# Patient Record
Sex: MALE | Race: WHITE
[De-identification: names, ages, dates, MRNs, and addresses within clinical notes are randomized per-mention and may not be internally consistent; named-entity substitution may affect disease eponyms.]

---

## 2018-05-05 ENCOUNTER — HOSPITAL ENCOUNTER (INPATIENT)
Dept: HOSPITAL 47 - EC | Age: 83
LOS: 4 days | Discharge: NURSING, REHAB OR LONG-TERM CARE | DRG: 481 | End: 2018-05-09
Payer: MEDICARE

## 2018-05-05 DIAGNOSIS — Z87.442: ICD-10-CM

## 2018-05-05 DIAGNOSIS — J44.1: ICD-10-CM

## 2018-05-05 DIAGNOSIS — I71.4: ICD-10-CM

## 2018-05-05 DIAGNOSIS — I48.0: ICD-10-CM

## 2018-05-05 DIAGNOSIS — R79.1: ICD-10-CM

## 2018-05-05 DIAGNOSIS — Z79.4: ICD-10-CM

## 2018-05-05 DIAGNOSIS — Z79.899: ICD-10-CM

## 2018-05-05 DIAGNOSIS — N18.3: ICD-10-CM
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
## 2018-05-05 DIAGNOSIS — N40.0: ICD-10-CM

## 2018-05-05 DIAGNOSIS — E11.649: ICD-10-CM

## 2018-05-05 DIAGNOSIS — Z87.891: ICD-10-CM

## 2018-05-05 DIAGNOSIS — Z85.46: ICD-10-CM

## 2018-05-05 DIAGNOSIS — E07.9: ICD-10-CM

## 2018-05-05 DIAGNOSIS — Z82.49: ICD-10-CM

## 2018-05-05 DIAGNOSIS — N39.0: ICD-10-CM

## 2018-05-05 DIAGNOSIS — I44.0: ICD-10-CM

## 2018-05-05 DIAGNOSIS — E11.22: ICD-10-CM

## 2018-05-05 DIAGNOSIS — E78.5: ICD-10-CM

## 2018-05-05 DIAGNOSIS — S72.142A: Primary | ICD-10-CM

## 2018-05-05 DIAGNOSIS — Z79.01: ICD-10-CM

## 2018-05-05 DIAGNOSIS — E11.51: ICD-10-CM

## 2018-05-05 DIAGNOSIS — Z92.3: ICD-10-CM

## 2018-05-05 DIAGNOSIS — I12.9: ICD-10-CM

## 2018-05-05 LAB
ANION GAP SERPL CALC-SCNC: 14 MMOL/L
APTT BLD: 35.8 SEC (ref 22–30)
BASOPHILS # BLD AUTO: 0 K/UL (ref 0–0.2)
BASOPHILS NFR BLD AUTO: 0 %
CHLORIDE SERPL-SCNC: 108 MMOL/L (ref 98–107)
CO2 SERPL-SCNC: 16 MMOL/L (ref 22–30)
EOSINOPHIL # BLD AUTO: 0 K/UL (ref 0–0.7)
EOSINOPHIL NFR BLD AUTO: 0 %
ERYTHROCYTE [DISTWIDTH] IN BLOOD BY AUTOMATED COUNT: 4.88 M/UL (ref 4.3–5.9)
ERYTHROCYTE [DISTWIDTH] IN BLOOD: 13.6 % (ref 11.5–15.5)
GLUCOSE BLD-MCNC: 83 MG/DL (ref 75–99)
HCT VFR BLD AUTO: 45.8 % (ref 39–53)
HGB BLD-MCNC: 14.5 GM/DL (ref 13–17.5)
INR PPP: 3.8 (ref ?–1.2)
LYMPHOCYTES # SPEC AUTO: 0.8 K/UL (ref 1–4.8)
LYMPHOCYTES NFR SPEC AUTO: 8 %
MCH RBC QN AUTO: 29.7 PG (ref 25–35)
MCHC RBC AUTO-ENTMCNC: 31.6 G/DL (ref 31–37)
MCV RBC AUTO: 93.8 FL (ref 80–100)
MONOCYTES # BLD AUTO: 0.7 K/UL (ref 0–1)
MONOCYTES NFR BLD AUTO: 6 %
NEUTROPHILS # BLD AUTO: 8.6 K/UL (ref 1.3–7.7)
NEUTROPHILS NFR BLD AUTO: 84 %
PLATELET # BLD AUTO: 220 K/UL (ref 150–450)
POTASSIUM SERPL-SCNC: 4.5 MMOL/L (ref 3.5–5.1)
PT BLD: 33.8 SEC (ref 9–12)
SODIUM SERPL-SCNC: 138 MMOL/L (ref 137–145)
WBC # BLD AUTO: 10.2 K/UL (ref 3.8–10.6)

## 2018-05-05 PROCEDURE — 93005 ELECTROCARDIOGRAM TRACING: CPT

## 2018-05-05 PROCEDURE — 73502 X-RAY EXAM HIP UNI 2-3 VIEWS: CPT

## 2018-05-05 PROCEDURE — 83036 HEMOGLOBIN GLYCOSYLATED A1C: CPT

## 2018-05-05 PROCEDURE — 94640 AIRWAY INHALATION TREATMENT: CPT

## 2018-05-05 PROCEDURE — 99285 EMERGENCY DEPT VISIT HI MDM: CPT

## 2018-05-05 PROCEDURE — 85027 COMPLETE CBC AUTOMATED: CPT

## 2018-05-05 PROCEDURE — 85730 THROMBOPLASTIN TIME PARTIAL: CPT

## 2018-05-05 PROCEDURE — 81001 URINALYSIS AUTO W/SCOPE: CPT

## 2018-05-05 PROCEDURE — 80048 BASIC METABOLIC PNL TOTAL CA: CPT

## 2018-05-05 PROCEDURE — 85025 COMPLETE CBC W/AUTO DIFF WBC: CPT

## 2018-05-05 PROCEDURE — 85610 PROTHROMBIN TIME: CPT

## 2018-05-05 PROCEDURE — 80051 ELECTROLYTE PANEL: CPT

## 2018-05-05 PROCEDURE — 71045 X-RAY EXAM CHEST 1 VIEW: CPT

## 2018-05-05 NOTE — ED
General Adult HPI





- General


Chief complaint: Extremity Injury, Lower


Stated complaint: FALL


Time Seen by Provider: 05/05/18 16:52


Source: patient, EMS, RN notes reviewed, old records reviewed


Mode of arrival: EMS


Limitations: no limitations





- History of Present Illness


Initial comments: 





this is an 82-year-old male the ER for evaluation.  Patient does say for 

evaluation regards to significant fall with positive left hip fracture.  

Patient is to be admitted for left hip fracture and evaluation.  Patient 

accepted in transfer for evaluation regarding left hip fracture





- Related Data


 Home Medications











 Medication  Instructions  Recorded  Confirmed


 


Insuln Asp Prt/Insulin Aspart 35 unit SQ AC-SUPPER 05/05/18 05/05/18





[NovoLOG MIX 70-30 VIAL]   


 


Insuln Asp Prt/Insulin Aspart 40 unit SQ AC-BRKFST 05/05/18 05/05/18





[NovoLOG MIX 70-30 VIAL]   


 


Lisinopril 40 mg PO HS 05/05/18 05/05/18


 


Methazolamide 50 mg PO BID 05/05/18 05/05/18


 


Propranolol [Inderal] 40 mg PO BID 05/05/18 05/05/18


 


Simvastatin [Zocor] 80 mg PO HS 05/05/18 05/05/18


 


Warfarin Sodium [Coumadin] 4 mg PO SUTUTHSA 05/05/18 05/05/18


 


Warfarin [Coumadin] 5 mg PO MOWEFR 05/05/18 05/05/18











 Allergies











Allergy/AdvReac Type Severity Reaction Status Date / Time


 


No Known Allergies Allergy   Verified 05/05/18 18:08














Review of Systems


ROS Statement: 


Those systems with pertinent positive or pertinent negative responses have been 

documented in the HPI.





ROS Other: All systems not noted in ROS Statement are negative.





Past Medical History


Past Medical History: Cancer, COPD, Diabetes Mellitus, Hyperlipidemia, 

Hypertension, Prostate Disorder


Additional Past Medical History / Comment(s): PAD, radiation for prostate 

cancer years ago, AAA that is being monitored, kidney stones


History of Any Multi-Drug Resistant Organisms: None Reported


Past Surgical History: Tonsillectomy


Additional Past Surgical History / Comment(s): patient states he had a surgery 

to tie is main vein to his heart and that is why he is on warfarin, lithotripsy


Past Psychological History: No Psychological Hx Reported


Smoking Status: Former smoker


Past Alcohol Use History: Rare


Past Drug Use History: None Reported





General Exam


Limitations: no limitations


General appearance: alert, in no apparent distress


Head exam: Present: atraumatic, normocephalic, normal inspection


Eye exam: Present: normal appearance, PERRL, EOMI.  Absent: scleral icterus, 

conjunctival injection, periorbital swelling


ENT exam: Present: normal exam, mucous membranes moist


Neck exam: Present: normal inspection.  Absent: tenderness, meningismus, 

lymphadenopathy


Respiratory exam: Present: normal lung sounds bilaterally.  Absent: respiratory 

distress, wheezes, rales, rhonchi, stridor


Cardiovascular Exam: Present: regular rate, normal rhythm, normal heart sounds.

  Absent: systolic murmur, diastolic murmur, rubs, gallop, clicks


GI/Abdominal exam: Present: soft, normal bowel sounds.  Absent: distended, 

tenderness, guarding, rebound, rigid


Extremities exam: Present: normal inspection, full ROM, normal capillary 

refill.  Absent: tenderness, pedal edema, joint swelling, calf tenderness


Back exam: Present: normal inspection


Neurological exam: Present: alert, oriented X3, CN II-XII intact


Psychiatric exam: Present: normal affect, normal mood


Skin exam: Present: warm, dry, intact, normal color.  Absent: rash





Course


 Vital Signs











  05/05/18 05/05/18





  16:52 17:41


 


Temperature 98.1 F 


 


Pulse Rate 71 73


 


Respiratory 18 18





Rate  


 


Blood Pressure 198/93 188/79


 


O2 Sat by Pulse 97 96





Oximetry  














- Reevaluation(s)


Reevaluation #1: 





05/05/18 18:19


Transient paperwork is reviewed





EKG Findings





- EKG Comments:


EKG Findings:: EKG shows sinus rhythm rate of 72, , QRS 92, QTc 431





Medical Decision Making





- Medical Decision Making





82 male the ER status post fall except in transfer for orthopedic surgery left 

hip fracture, patient will be admitted orthopedic surgery and fell evaluation, 

patient to be admitted for pain control





- Radiology Data


Radiology results: report reviewed (CT is reviewed positive for left hip 

fracture)





Disposition


Clinical Impression: 


 Fracture of left hip, Fall





Disposition: ADMITTED AS IP TO THIS hospitals


Condition: Fair


Is patient prescribed a controlled substance at d/c from ED?: No


Referrals: 


Je Moreno MD [REFERRING] - 1-2 days

## 2018-05-06 LAB
ANION GAP SERPL CALC-SCNC: 13 MMOL/L
BUN SERPL-SCNC: 28 MG/DL (ref 9–20)
CALCIUM SPEC-MCNC: 8.2 MG/DL (ref 8.4–10.2)
CHLORIDE SERPL-SCNC: 110 MMOL/L (ref 98–107)
CO2 SERPL-SCNC: 21 MMOL/L (ref 22–30)
GLUCOSE BLD-MCNC: 133 MG/DL (ref 75–99)
GLUCOSE BLD-MCNC: 217 MG/DL (ref 75–99)
GLUCOSE BLD-MCNC: 221 MG/DL (ref 75–99)
GLUCOSE BLD-MCNC: 240 MG/DL (ref 75–99)
GLUCOSE SERPL-MCNC: 174 MG/DL (ref 74–99)
GLUCOSE UR QL: (no result)
GLUCOSE UR QL: (no result)
GRAN CASTS UR QL COMP ASSIST: 4 /LPF
HBA1C MFR BLD: 7.1 % (ref 4–6)
INR PPP: 4.1 (ref ?–1.2)
KETONES UR QL STRIP.AUTO: (no result)
PH UR: 5.5 [PH] (ref 5–8)
PH UR: 6 [PH] (ref 5–8)
POTASSIUM SERPL-SCNC: 4.1 MMOL/L (ref 3.5–5.1)
PROT UR QL: (no result)
PROT UR QL: (no result)
PT BLD: 36.5 SEC (ref 9–12)
RBC UR QL: 5 /HPF (ref 0–5)
RBC UR QL: 7 /HPF (ref 0–5)
SODIUM SERPL-SCNC: 144 MMOL/L (ref 137–145)
SP GR UR: 1.02 (ref 1–1.03)
SP GR UR: 1.02 (ref 1–1.03)
SQUAMOUS UR QL AUTO: <1 /HPF (ref 0–4)
UROBILINOGEN UR QL STRIP: <2 MG/DL (ref ?–2)
UROBILINOGEN UR QL STRIP: <2 MG/DL (ref ?–2)
WBC #/AREA URNS HPF: 2 /HPF (ref 0–5)
WBC #/AREA URNS HPF: 4 /HPF (ref 0–5)

## 2018-05-06 RX ADMIN — HYDROCODONE BITARTRATE AND ACETAMINOPHEN PRN EACH: 7.5; 325 TABLET ORAL at 05:32

## 2018-05-06 RX ADMIN — METOPROLOL TARTRATE SCH MG: 25 TABLET, FILM COATED ORAL at 20:06

## 2018-05-06 RX ADMIN — INSULIN ASPART SCH UNIT: 100 INJECTION, SOLUTION INTRAVENOUS; SUBCUTANEOUS at 08:49

## 2018-05-06 RX ADMIN — HYDROCODONE BITARTRATE AND ACETAMINOPHEN PRN EACH: 7.5; 325 TABLET ORAL at 17:55

## 2018-05-06 RX ADMIN — HYDROCODONE BITARTRATE AND ACETAMINOPHEN PRN EACH: 7.5; 325 TABLET ORAL at 12:15

## 2018-05-06 RX ADMIN — INSULIN ASPART SCH UNIT: 100 INJECTION, SOLUTION INTRAVENOUS; SUBCUTANEOUS at 20:06

## 2018-05-06 RX ADMIN — INSULIN ASPART SCH: 100 INJECTION, SOLUTION INTRAVENOUS; SUBCUTANEOUS at 12:09

## 2018-05-06 RX ADMIN — INSULIN ASPART SCH UNIT: 100 INJECTION, SOLUTION INTRAVENOUS; SUBCUTANEOUS at 17:20

## 2018-05-06 NOTE — P.CONS
History of Present Illness





- Reason for Consult


Preoperative clearance





- History of Present Illness


82-year-old pretty functional gentleman had a mechanical fall and fracture of 

the left hip.  Patient is having some pain in the hip area was consulted for 

medical clearance.  Patient has history of atrial fibrillation presently rate 

controlled denied any chest pain patient EKG showed first-degree AV block 

propranolol will be discussed patient was started on both low-dose beta 

blocker.  Unsure whether when patient is to go for surgery will be either today 

or tomorrow patient doesn't have any murmurs no further testing or intervention 

is necessary patient can go in and get the surgery done if we have time I'll 

order an echocardiogram for preoperative clearance others patient is low to 

intermediate risk for surgery discussed with the patient patient is agreeable 

for surgery is tremulous.  Patient functionality is fairly good denied any 

coronary artery disease denied any congestive heart failure denied any 

shortness of breath used to be a smoker in the past.





Patient is diabetic and 70/30 insulin which was switched to Lantus at a lower 

dose and sliding scale insulin as I'm unsure whether patient is going to be 

nothing by mouth are not area did patient had elevated INR of 3.6 if patient is 

going for surgery tomorrow today we can give 5 mg of the oral vitamin K if that 

is agreeable from arthritic perspective.  Patient beta blocker will be switched 

to metoprolol will repeat EKG tomorrow morning because of first-degree AV block 

which is again secondary to beta blocker and dose of which is changed today.  

Coumadin will be held antidepressive medication except for beta blocker will be 

held because of high possibility of perioperative hypotension.





Review of Systems





REVIEW OF SYSTEMS: 


CONSTITUTIONAL: No fever, no malaise, no fatigue. 


HEENT: No recent visual problems or hearing problems. Denied any sore throat. 


CARDIOVASCULAR: No chest pain, orthopnea, PND, no palpitations, no syncope. 


PULMONARY: No shortness of breath, no cough, no hemoptysis. 


GASTROINTESTINAL: No diarrhea, no nausea, no vomiting, no abdominal pain. 

Normoactive bowel sounds. 


NEUROLOGICAL: No headaches, no weakness, no numbness. 


HEMATOLOGICAL: Denies any bleeding or petechiae. 


GENITOURINARY: Denies any burning micturition, frequency, or urgency. 


MUSCULOSKELETAL/RHEUMATOLOGICAL: As mentioned in HPI


ENDOCRINE: Denies any polyuria or polydipsia. 





The rest of the 14-point review of systems is negative.








Past Medical History


Past Medical History: Cancer, COPD, Diabetes Mellitus, Hyperlipidemia, 

Hypertension, Prostate Disorder


Additional Past Medical History / Comment(s): PAD, radiation for prostate 

cancer years ago, AAA that is being monitored, kidney stones


History of Any Multi-Drug Resistant Organisms: None Reported


Past Surgical History: Tonsillectomy


Additional Past Surgical History / Comment(s): patient states he had a surgery 

to tie is main vein to his heart and that is why he is on warfarin, lithotripsy


Past Psychological History: No Psychological Hx Reported


Smoking Status: Former smoker


Past Alcohol Use History: Rare


Past Drug Use History: None Reported





- Past Family History


  ** Father


Family Medical History: Myocardial Infarction (MI)





  ** Mother


Family Medical History: CVA/TIA





Medications and Allergies


 Home Medications











 Medication  Instructions  Recorded  Confirmed  Type


 


Insuln Asp Prt/Insulin Aspart 35 unit SQ AC-SUPPER 05/05/18 05/05/18 History





[NovoLOG MIX 70-30 VIAL]    


 


Insuln Asp Prt/Insulin Aspart 40 unit SQ AC-BRKFST 05/05/18 05/05/18 History





[NovoLOG MIX 70-30 VIAL]    


 


Lisinopril 40 mg PO HS 05/05/18 05/05/18 History


 


Methazolamide 50 mg PO BID 05/05/18 05/05/18 History


 


Propranolol [Inderal] 40 mg PO BID 05/05/18 05/05/18 History


 


Simvastatin [Zocor] 80 mg PO HS 05/05/18 05/05/18 History


 


Warfarin Sodium [Coumadin] 4 mg PO SUTUTHSA 05/05/18 05/05/18 History


 


Warfarin [Coumadin] 5 mg PO MOWEFR 05/05/18 05/05/18 History











 Allergies











Allergy/AdvReac Type Severity Reaction Status Date / Time


 


No Known Allergies Allergy   Verified 05/05/18 18:08














Physical Exam


Vitals: 


 Vital Signs











  Temp Pulse Pulse Resp BP BP Pulse Ox


 


 05/06/18 07:00  99 F   77  14   127/63  97


 


 05/06/18 01:05  98.2 F   97  16   158/70  97


 


 05/06/18 00:00    97  16   


 


 05/05/18 22:10    86  16   


 


 05/05/18 21:59  97.9 F   86  16   144/83  98


 


 05/05/18 18:41  98.4 F  76   18  185/81   97


 


 05/05/18 17:41   73   18  188/79   96


 


 05/05/18 16:52  98.1 F  71   18  198/93   97








 Intake and Output











 05/05/18 05/06/18 05/06/18





 22:59 06:59 14:59


 


Intake Total 1400 800 


 


Balance 1400 800 


 


Intake:   


 


  Intake, IV Titration 800 800 





  Amount   


 


    Sodium Chloride 0.9% 1, 800 800 





    000 ml @ 100 mls/hr IV .   





    Q10H ONE Rx#:[id number]   


 


  Oral 600  


 


Other:   


 


  Voiding Method Urinal  


 


  # Voids 2 3 


 


  Weight 102.058 kg  











PHYSICAL EXAMINATION: 





GENERAL: The patient is alert and oriented x3, not in any acute distress. Well 

developed, well nourished. 


HEENT: Pupils are round and equally reacting to light. EOMI. No scleral 

icterus. No conjunctival pallor. Normocephalic, atraumatic. No pharyngeal 

erythema. No thyromegaly. 


CARDIOVASCULAR: S1 and S2 present. No murmurs, rubs, or gallops. 


PULMONARY: Chest is clear to auscultation, no wheezing or crackles. 


ABDOMEN: Soft, nontender, nondistended, normoactive bowel sounds. No palpable 

organomegaly. 


MUSCULOSKELETAL: Deferred to orthopedic surgery


EXTREMITIES: No cyanosis, clubbing, or pedal edema. 


NEUROLOGICAL: Gross neurological examination did not reveal any focal deficits. 


SKIN: No rashes. 











Results


CBC & Chem 7: 


 05/05/18 22:13





 05/05/18 22:13


Labs: 


 Abnormal Lab Results - Last 24 Hours (Table)











  05/05/18 05/05/18 05/05/18 Range/Units





  22:13 22:13 22:13 


 


Neutrophils #  8.6 H    (1.3-7.7)  k/uL


 


Lymphocytes #  0.8 L    (1.0-4.8)  k/uL


 


PT   33.8 H   (9.0-12.0)  sec


 


INR   3.8 H   (<1.2)  


 


APTT   35.8 H   (22.0-30.0)  sec


 


Chloride    108 H  ()  mmol/L


 


Carbon Dioxide    16 L  (22-30)  mmol/L


 


POC Glucose (mg/dL)     (75-99)  mg/dL


 


Urine Protein     (Negative)  


 


Urine Glucose (UA)     (Negative)  


 


Urine Ketones     (Negative)  


 


Urine Blood     (Negative)  


 


Urine RBC     (0-5)  /hpf


 


Urine Bacteria     (None)  /hpf


 


Urine Mucus     (None)  /hpf














  05/06/18 05/06/18 Range/Units





  06:44 07:30 


 


Neutrophils #    (1.3-7.7)  k/uL


 


Lymphocytes #    (1.0-4.8)  k/uL


 


PT    (9.0-12.0)  sec


 


INR    (<1.2)  


 


APTT    (22.0-30.0)  sec


 


Chloride    ()  mmol/L


 


Carbon Dioxide    (22-30)  mmol/L


 


POC Glucose (mg/dL)  221 H   (75-99)  mg/dL


 


Urine Protein   2+ H  (Negative)  


 


Urine Glucose (UA)   4+ H  (Negative)  


 


Urine Ketones   1+ H  (Negative)  


 


Urine Blood   Small H  (Negative)  


 


Urine RBC   7 H  (0-5)  /hpf


 


Urine Bacteria   Rare H  (None)  /hpf


 


Urine Mucus   Rare H  (None)  /hpf














Assessment and Plan


Plan: 


1 preoperative clearance for left hip arthroplasty elective surgery: Patient is 

low to intermediate risk.


-Atrial fibrillation presently rate controlled management of beta blockers as 

mentioned above hold off Coumadin for possible surgery today or tomorrow


-Hypertension


-Type 2 diabetes mellitus insulin regimen as mentioned above Lantus 35 units 

along with sliding scale insulin.  Check the blood sugars.


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


-Hyperlipidemia


-COPD without any acute exacerbation


-Benign prostatic hypertrophy

## 2018-05-06 NOTE — XR
EXAMINATION TYPE: XR chest 1V portable

 

DATE OF EXAM: 5/6/2018

 

HISTORY: Surgical clearance.

 

REFERENCE: NONE.

 

FINDINGS: The lungs are clear. Pleural space are clear. The heart is not enlarged.

 

IMPRESSION: 

 

NO ACTIVE INTRATHORACIC DISEASE.

## 2018-05-06 NOTE — P.HPOR
History of Present Illness


H&P Date: 05/06/18


Chief Complaint: Left hip pain status post fall





Patient is a very pleasant 82-year-old male who is seen and examined at bedside 

for further evaluation for his known left intertrochanteric hip fracture.  

Patient states he was in his kitchen yesterday when he turned one direction, 

then turned the other direction, and then fell on his left hip.  He was taken 

to [facility] for further evaluation.  He was subsequently transferred 

to [facility] and admitted to Dr. Edwards's service.  Imaging 

taken at [facility] did show evidence of a left intertrochanteric hip fracture.  

Patient has continued to have significant pain at the left hip following his 

fall.  He has been nonambulatory.  He denies any right hip pain or discomfort.  

He denies any other injuries.  Patient states he is known to be on Coumadin 

which he states is for his legs.  Coumadin is currently being held in 

anticipation for surgical intervention.  Lab testing yesterday showed an INR 

level of 3.8.  Patient has been seen and examined by Dr. Will in medicine 

today who is seeing the patient for surgical clearance.  He has ordered a 

repeat PT/INR lab draws.  Urinalysis was also taken last night which nursing 

states it is unclear whether it was a clean catch.  His urinalysis did show 

evidence of urine RBC, WBC, bacteria, mucous, protein, glucose, and ketones.  

Patient has no other complaints this morning.  He is eating and voiding without 

difficulty.





Past Medical History


Past Medical History: Cancer, COPD, Diabetes Mellitus, Hyperlipidemia, 

Hypertension, Prostate Disorder


Additional Past Medical History / Comment(s): PAD, radiation for prostate 

cancer years ago, AAA that is being monitored, kidney stones


History of Any Multi-Drug Resistant Organisms: None Reported


Past Surgical History: Tonsillectomy


Additional Past Surgical History / Comment(s): patient states he had a surgery 

to tie is main vein to his heart and that is why he is on warfarin, lithotripsy


Past Psychological History: No Psychological Hx Reported


Smoking Status: Former smoker


Past Alcohol Use History: Rare


Past Drug Use History: None Reported





- Past Family History


  ** Father


Family Medical History: Myocardial Infarction (MI)





  ** Mother


Family Medical History: CVA/TIA





Medications and Allergies


 Home Medications











 Medication  Instructions  Recorded  Confirmed  Type


 


Insuln Asp Prt/Insulin Aspart 35 unit SQ AC-SUPPER 05/05/18 05/05/18 History





[NovoLOG MIX 70-30 VIAL]    


 


Insuln Asp Prt/Insulin Aspart 40 unit SQ AC-BRKFST 05/05/18 05/05/18 History





[NovoLOG MIX 70-30 VIAL]    


 


Lisinopril 40 mg PO HS 05/05/18 05/05/18 History


 


Methazolamide 50 mg PO BID 05/05/18 05/05/18 History


 


Propranolol [Inderal] 40 mg PO BID 05/05/18 05/05/18 History


 


Simvastatin [Zocor] 80 mg PO HS 05/05/18 05/05/18 History


 


Warfarin Sodium [Coumadin] 4 mg PO SUTUTHSA 05/05/18 05/05/18 History


 


Warfarin [Coumadin] 5 mg PO MOWEFR 05/05/18 05/05/18 History











 Allergies











Allergy/AdvReac Type Severity Reaction Status Date / Time


 


No Known Allergies Allergy   Verified 05/05/18 18:08














Physical Examination





Physical exam: 


Patient is awake, alert, and oriented 3


Vital signs stable


Good chest excursion with deep inspiration and expiration


Abdomen soft nontender


Left lower extremity is shortened and externally rotated


Pain with palpation of the left hip


Significant pain with internal and external rotation of the left hip


No pain with internal and external rotation of the right hip


Neurovascularly intact bilateral lower extremities


Dorsiflexion, plantarflexion, and extensor hallucis longus positive sustained 

bilaterally


Calves are soft and supple; No signs or symptoms of DVT; No calf pain





Results





Pertinent studies:


PT/INR taken on 05/05/2018: INR 3.8; PT 33.8





Urinalysis taken on 05/06/2018: Positive for urine RBC, WBC, bacteria, mucous, 

protein, glucose, and ketones





- Labs


Labs: 


 Abnormal Lab Results - Last 24 Hours (Table)











  05/05/18 05/05/18 05/05/18 Range/Units





  22:13 22:13 22:13 


 


Neutrophils #  8.6 H    (1.3-7.7)  k/uL


 


Lymphocytes #  0.8 L    (1.0-4.8)  k/uL


 


PT   33.8 H   (9.0-12.0)  sec


 


INR   3.8 H   (<1.2)  


 


APTT   35.8 H   (22.0-30.0)  sec


 


Chloride    108 H  ()  mmol/L


 


Carbon Dioxide    16 L  (22-30)  mmol/L


 


POC Glucose (mg/dL)     (75-99)  mg/dL


 


Urine Protein     (Negative)  


 


Urine Glucose (UA)     (Negative)  


 


Urine Ketones     (Negative)  


 


Urine Blood     (Negative)  


 


Urine RBC     (0-5)  /hpf


 


Urine Bacteria     (None)  /hpf


 


Urine Mucus     (None)  /hpf














  05/06/18 05/06/18 Range/Units





  06:44 07:30 


 


Neutrophils #    (1.3-7.7)  k/uL


 


Lymphocytes #    (1.0-4.8)  k/uL


 


PT    (9.0-12.0)  sec


 


INR    (<1.2)  


 


APTT    (22.0-30.0)  sec


 


Chloride    ()  mmol/L


 


Carbon Dioxide    (22-30)  mmol/L


 


POC Glucose (mg/dL)  221 H   (75-99)  mg/dL


 


Urine Protein   2+ H  (Negative)  


 


Urine Glucose (UA)   4+ H  (Negative)  


 


Urine Ketones   1+ H  (Negative)  


 


Urine Blood   Small H  (Negative)  


 


Urine RBC   7 H  (0-5)  /hpf


 


Urine Bacteria   Rare H  (None)  /hpf


 


Urine Mucus   Rare H  (None)  /hpf








 H & H











  05/05/18 Range/Units





  22:13 


 


Hgb  14.5  (13.0-17.5)  gm/dL


 


Hct  45.8  (39.0-53.0)  %








 Coagulation











  05/05/18 Range/Units





  22:13 


 


INR  3.8 H  (<1.2)  











Result Diagrams: 


 05/05/18 22:13





 05/05/18 22:13





Assessment and Plan


Assessment: 





Assessment:


Left intertrochanteric hip fracture status post fall


Left hip pain


Currently on anticoagulation therapy with warfarin


Possible urinary tract infection


(1) Intertrochanteric fracture of left femur


Current Visit: Yes   Status: Acute   Code(s): S72.142A - DISPLACED 

INTERTROCHANTERIC FRACTURE OF LEFT FEMUR, INIT   SNOMED Code(s): 910817654


   





(2) Left hip pain


Current Visit: Yes   Status: Acute   Code(s): M25.552 - PAIN IN LEFT HIP   

SNOMED Code(s): 14650980


   





(3) Status post fall


Current Visit: Yes   Status: Acute   Code(s): Z91.81 - HISTORY OF FALLING   

SNOMED Code(s): 918130345


   





(4) Warfarin anticoagulation


Current Visit: Yes   Status: Acute   Code(s): Z79.01 - LONG TERM (CURRENT) USE 

OF ANTICOAGULANTS   SNOMED Code(s): 13755406


   





(5) Fall


Current Visit: Yes   Status: Acute   Code(s): W19.XXXA - UNSPECIFIED FALL, 

INITIAL ENCOUNTER   SNOMED Code(s): 7481688


   


Plan: 





Plan:


1.  Patient was admitted to Dr. Edwards at [facility] after 

transfer from [facility].  Patient has evidence of a left 

intertrochanteric hip fracture.  Patient will plan to undergo a ORIF of the 

left hip with intramedullary nail fixation for left intertrochanteric hip 

fracture.  Patient currently has an elevated INR level of 3.8.  Repeat lab 

draws have been ordered.  If his INR level is able to reduce to a therapeutic 

range, we will plan to proceed for surgical intervention tomorrow, 05/07/2018 

at approximately 1500 hrs.  Patient will remain on bedrest and nonweightbearing 

on the left lower extremity.  He may eat a regular diet today while becoming 

nothing by mouth status at midnight.  Patient will continue to be seen and 

examined by Dr. Will to obtain surgical clearance prior to surgical 

intervention.  Chest x-ray has been ordered today.  We will also order a repeat 

urinalysis after there is some question as to if his previous urinalysis was 

clean catch and lab testing resulted multiple findings including urine RBC, WBC

, bacteria, mucous, protein, glucose, and ketones.  If urinalysis results 

abnormal findings, nursing will plan to contact medicine for an appropriate 

antibiotic regimen.  We'll continue to follow patient closely and we'll plan 

for surgical intervention tomorrow if cleared from a medical standpoint.


2.  Dr. Will in medicine will continue following the patient for medical 

clearance prior to surgical intervention


3.  Patient has been discussed in detail with Dr. Sherif Edwards and he agrees 

with this plan


Time with Patient: Less than 30

## 2018-05-06 NOTE — PN
PROGRESS NOTE



DATE OF SERVICE:

05/06/2018.



HISTORY:

Mr. Vieyra is a very pleasant 82-year-old gentleman.  I visited him at the bedside

today.  He does live alone.  Yesterday he was in his kitchen and he twisted and turned

and fell onto his left side.  He was unable to move it.  He was initially taken to

[facility].  Workup including CT scan the left hip revealed a minimally

displaced intertrochanteric hip fracture.  He was then subsequently transferred to

[facility] for care of this.  He was admitted and admitted from the emergency

department to my service.

He is resting comfortably when I saw him this morning.  He has no pain to report other

than to the left hip.



PHYSICAL EXAM:

On examination, he is afebrile.  Vital signs are stable.  He has no pain with palpation

or range of motion of all long bones and joints with the exception of left lower

extremity.  The skin over the left hip is intact.  He has intact flexion-extension,

inversion-eversion of his foot.  He has intact flexion-extension of his toes.  He has

intact lateral medial plantar and first dorsal webspace sensation in the left foot and

he has a palpable posterior tibial pulse and brisk capillary refill in all digits.



RADIOGRAPHIC STUDIES:

CT scan from [facility] in [place] shows a minimally displaced 2-part intertrochanteric

hip fracture on the left side.



IMPRESSION:

Left minimally displaced intertrochanteric hip fracture of the left hip.



RECOMMENDATIONS:

I had a long discussion with Mr. Vieyra with regard to treatment options.  He is an

independent ambulator.  Recommendation was for intramedullary hip screw fixation for

his left intertrochanteric hip fracture.  His INR last drawn was 3.4.  He is also an

insulin-dependent diabetic.  We will have Internal Medicine see him and clear him for

surgery.  Also need to stop his Coumadin and allow his INR to fall down to a more

acceptable level for surgery.  We will plan to go forward with operative intervention

of his left hip tomorrow.  We will check his coags tomorrow morning.  All of Mr. Vieyra's questions were answered to his satisfaction.





MMODL / IJN: [id number] / Job#: [id number]

## 2018-05-07 LAB
ERYTHROCYTE [DISTWIDTH] IN BLOOD BY AUTOMATED COUNT: 4.14 M/UL (ref 4.3–5.9)
ERYTHROCYTE [DISTWIDTH] IN BLOOD: 13.3 % (ref 11.5–15.5)
GLUCOSE BLD-MCNC: 105 MG/DL (ref 75–99)
GLUCOSE BLD-MCNC: 111 MG/DL (ref 75–99)
GLUCOSE BLD-MCNC: 143 MG/DL (ref 75–99)
GLUCOSE BLD-MCNC: 67 MG/DL (ref 75–99)
GLUCOSE BLD-MCNC: 80 MG/DL (ref 75–99)
GLUCOSE BLD-MCNC: 97 MG/DL (ref 75–99)
HCT VFR BLD AUTO: 38.5 % (ref 39–53)
HGB BLD-MCNC: 12.5 GM/DL (ref 13–17.5)
INR PPP: 1.9 (ref ?–1.2)
INR PPP: 2.8 (ref ?–1.2)
MCH RBC QN AUTO: 30.3 PG (ref 25–35)
MCHC RBC AUTO-ENTMCNC: 32.6 G/DL (ref 31–37)
MCV RBC AUTO: 92.9 FL (ref 80–100)
PLATELET # BLD AUTO: 171 K/UL (ref 150–450)
PT BLD: 17.2 SEC (ref 9–12)
PT BLD: 24.9 SEC (ref 9–12)
WBC # BLD AUTO: 7.9 K/UL (ref 3.8–10.6)

## 2018-05-07 PROCEDURE — 0QS706Z REPOSITION LEFT UPPER FEMUR WITH INTRAMEDULLARY INTERNAL FIXATION DEVICE, OPEN APPROACH: ICD-10-PCS

## 2018-05-07 RX ADMIN — INSULIN ASPART SCH: 100 INJECTION, SOLUTION INTRAVENOUS; SUBCUTANEOUS at 23:59

## 2018-05-07 RX ADMIN — IPRATROPIUM BROMIDE AND ALBUTEROL SULFATE PRN ML: .5; 3 SOLUTION RESPIRATORY (INHALATION) at 17:09

## 2018-05-07 RX ADMIN — MORPHINE SULFATE PRN MG: 4 INJECTION, SOLUTION INTRAMUSCULAR; INTRAVENOUS at 16:52

## 2018-05-07 RX ADMIN — HYDRALAZINE HYDROCHLORIDE ONE MG: 20 INJECTION INTRAMUSCULAR; INTRAVENOUS at 21:40

## 2018-05-07 RX ADMIN — BUDESONIDE AND FORMOTEROL FUMARATE DIHYDRATE SCH: 160; 4.5 AEROSOL RESPIRATORY (INHALATION) at 21:35

## 2018-05-07 RX ADMIN — INSULIN ASPART SCH: 100 INJECTION, SOLUTION INTRAVENOUS; SUBCUTANEOUS at 17:39

## 2018-05-07 RX ADMIN — METOPROLOL TARTRATE SCH MG: 25 TABLET, FILM COATED ORAL at 09:40

## 2018-05-07 RX ADMIN — HYDROCODONE BITARTRATE AND ACETAMINOPHEN PRN EACH: 7.5; 325 TABLET ORAL at 03:32

## 2018-05-07 RX ADMIN — INSULIN ASPART SCH: 100 INJECTION, SOLUTION INTRAVENOUS; SUBCUTANEOUS at 12:02

## 2018-05-07 RX ADMIN — INSULIN ASPART SCH: 100 INJECTION, SOLUTION INTRAVENOUS; SUBCUTANEOUS at 07:40

## 2018-05-07 RX ADMIN — HYDRALAZINE HYDROCHLORIDE ONE MG: 20 INJECTION INTRAMUSCULAR; INTRAVENOUS at 21:54

## 2018-05-07 NOTE — P.PN
Subjective


82-year-old gentleman admitted secondary to fracture of the left hip.  Patient 

will undergo surgery today his INR has come down to 1.9.  My concern is his 

respiratory status patient has significant wheezing when I examined of which 

apparently improved with breathing treatments and Symbicort and the patient can 

go for surgery today.  Patient was hypoglycemic because of which Lantus dose 

was decreased.  Patient renal function is borderline and around 1.3 because of 

which Toradol was discontinued patient probably will need IV fluids and I'm 

believe his kidney function will improve with that





Constitutional: Denied any fatigue denied any fever.


Cardio vascular: denied any chest pain, palpitations


Gastrointestinal denied any nausea vomiting


Pulmonary: Denied any shortness of breath cough


Neurologic denied any new focal deficits








Objective





- Vital Signs


Vital signs: 


 Vital Signs











Temp  98.9 F   05/07/18 15:17


 


Pulse  75   05/07/18 17:20


 


Resp  16   05/07/18 14:35


 


BP  179/82   05/07/18 14:35


 


Pulse Ox  95   05/07/18 14:35








 Intake & Output











 05/06/18 05/07/18 05/07/18





 18:59 06:59 18:59


 


Intake Total 800  


 


Output Total  100 570


 


Balance 800 -100 -570


 


Intake:   


 


  Intake, IV Titration 800  





  Amount   


 


    Sodium Chloride 0.9% 1, 800  





    000 ml @ 100 mls/hr IV .   





    Q10H ONE Rx#:[id number]   


 


Output:   


 


  Urine  100 570


 


Other:   


 


  Voiding Method  Urinal Urinal


 


  # Voids  1 2














- Exam


PHYSICAL EXAMINATION: 





GENERAL: The patient is alert and oriented x3, not in any acute distress. Well 

developed, well nourished. 


HEENT: Pupils are round and equally reacting to light. EOMI. No scleral 

icterus. No conjunctival pallor. Normocephalic, atraumatic. No pharyngeal 

erythema. No thyromegaly. 


CARDIOVASCULAR: S1 and S2 present. No murmurs, rubs, or gallops. 


PULMONARY: Significant expiratory wheezing which improved with breathing 

treatments. 


ABDOMEN: Soft, nontender, nondistended, normoactive bowel sounds. No palpable 

organomegaly. 


MUSCULOSKELETAL: Deferred to orthopedic surgery


EXTREMITIES: No cyanosis, clubbing, or pedal edema. 


NEUROLOGICAL: Gross neurological examination did not reveal any focal deficits. 


SKIN: No rashes. 














- Labs


CBC & Chem 7: 


 05/07/18 06:51





 05/06/18 10:36


Labs: 


 Abnormal Lab Results - Last 24 Hours (Table)











  05/05/18 05/06/18 05/07/18 Range/Units





  22:13 20:01 06:51 


 


RBC    4.14 L  (4.30-5.90)  m/uL


 


Hgb    12.5 L  (13.0-17.5)  gm/dL


 


Hct    38.5 L  (39.0-53.0)  %


 


PT     (9.0-12.0)  sec


 


INR     (<1.2)  


 


POC Glucose (mg/dL)   217 H   (75-99)  mg/dL


 


Hemoglobin A1c  7.1 H    (4.0-6.0)  %














  05/07/18 05/07/18 05/07/18 Range/Units





  09:01 11:19 11:58 


 


RBC     (4.30-5.90)  m/uL


 


Hgb     (13.0-17.5)  gm/dL


 


Hct     (39.0-53.0)  %


 


PT  24.9 H    (9.0-12.0)  sec


 


INR  2.8 H    (<1.2)  


 


POC Glucose (mg/dL)   67 L  111 H  (75-99)  mg/dL


 


Hemoglobin A1c     (4.0-6.0)  %














  05/07/18 05/07/18 Range/Units





  16:37 16:47 


 


RBC    (4.30-5.90)  m/uL


 


Hgb    (13.0-17.5)  gm/dL


 


Hct    (39.0-53.0)  %


 


PT  17.2 H   (9.0-12.0)  sec


 


INR  1.9 H   (<1.2)  


 


POC Glucose (mg/dL)   105 H  (75-99)  mg/dL


 


Hemoglobin A1c    (4.0-6.0)  %














Assessment and Plan


Plan: 


1 preoperative clearance for left hip arthroplasty elective surgery: Patient is 

low to intermediate risk.


-Atrial fibrillation presently rate controlled management of beta blockers as 

mentioned above hold off Coumadin for possible surgery today , patient received 

vitamin K


-Hypertension


-Type 2 diabetes mellitus insulin regimen as mentioned above Lantus 20 units 

along with sliding scale insulin.  Lantus dose was increased from 35-20 units 

as his blood sugars went down patient will need increase the dose of Lantus as 

well as female insulin once he go back on diet


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 

was started on metoprolol


-Hyperlipidemia


-COPD with some acute exacerbation: Patient was started on inhalational 

treatments inhaled steroids


-Benign prostatic hypertrophy

## 2018-05-07 NOTE — OP
OPERATIVE REPORT



DATE OF PROCEDURE:

05/07/2018.



PREOP DIAGNOSIS:

Left intertrochanteric hip fracture.



POSTOPERATIVE DIAGNOSIS:

Left intertrochanteric hip fracture.



PROCEDURE PERFORMED:

Left intramedullary hip screw fixation for left intertrochanteric hip fracture.



SURGEON:

Sherif Edwards M.D.



ANESTHESIA:

General endotracheal.



ESTIMATED BLOOD LOSS:

25 mL.



DRAINS:

None.



COMPLICATIONS:

None apparent.



DISPOSITION:

Postanesthesia care unit.



INDICATIONS:

Mr. Vieyra is a very pleasant 82-year-old male who fell at home onto his left hip.  He

was initially taken to [facility] in [place] where his x-rays and a CT scan

found a intertrochanteric hip fracture.  He was then transferred to [facility]

for definitive care.  He is admitted to my service.  He was on Coumadin for

anticoagulation.  He was also cleared by the medical service waiting for his INR to get

down to more acceptable level for surgery.



The risks of procedure were discussed with him and his family in detail.  These risks

include, but are not limited to risk of infection, nerve damage, bleeding, pain and

risk of deep vein thrombosis which could lead to fatal pulmonary embolism.  There was

also risk of failure of the fracture to heal, failure of the implant or fracture around

the implant.  All of his and his family's questions with regards to the risks were

answered to her satisfaction.  Appropriate informed consent was obtained.



PROCEDURE:

The patient identified in the preoperative holding area.  Surgical sites marked by both

the patient myself.  He was given 2 g of Ancef IV for prophylactic purposes.  He was

then transferred to the operative suite, was placed supine on the operative table.

General anesthetic was administered dosed per the anesthesia without apparent

complication.  He was then placed onto the fracture table well-padded in preparation

for surgery.  The fracture was reduced with longitudinal traction and rotation and was

the reduction was confirmed with fluoroscopic imaging.



The patient's left lower extremity was then prepped and draped in usual sterile

fashion.  Standard surgical pause undertaken to ensure that we were operating the

correct site and that appropriate preop antibiotics were given.  All staff in the room

in agreement we proceeded.



The tip of the greater trochanter was identified.  A proximal 3 cm incision extending

from the tip of the greater trochanter proximally in line with the femur was made with

the 10 blade scalpel.  Dissection was then carried down sharply through the tensor

fascia as well.



The curved awl was then placed on the medial aspect of the tip of the greater

trochanter.  The threaded guide pin was then advanced down the shaft of the femur.

This was confirmed with fluoroscopic imaging.



The starting drill was then utilized to gain access to the proximal femur.  The

threaded guide pin was removed and then the ball-tipped guide wire was then placed down

the shaft of the femur.  Its placement was also confirmed with fluoroscopic imaging.



I then reamed the femoral shaft starting with a 9 mm reamer and incrementally

increasing up to a 13 mm reamer.  I had the representative open an 11 mm x 180 mm x 125

degree gamma nail.  This was assembled on the back table.  This was then implanted over

the ball-tipped guidewire.  The ball-tipped guidewire was then removed.



I then made a 2nd small incision on the lateral thigh.  The hip screw guide was then

placed flush against the lateral cortex of the femur.  The threaded guide pin was then

placed into the center of the femoral head on both AP and lateral views.  This was done

under fluoroscopic imaging.  The tip-apex distance was appropriate.



I then measured for length.  100 mm was measured.  The drill was set to 100 mm and then

the cannulated drill was then utilized under fluoroscopic imaging.



I then had the representative open a 10 mm x 100 mm Joanna cannulated hip screw.  This

was then inserted over the threaded guide pin deep into the center of femoral head.

The tip-apex distance was appropriate.  Excellent bone quality and the screw had

excellent purchase in bone.



I then proceeded to compress the fracture site with compression device and then placed

the set screw tightened and backed off 1/4 turn to allow for compression.



I then proceeded with placement of the distal interlocking screw.  A third small stab

incision was made over the lateral thigh.  A 5 mm x 40 mm distal screw was then placed

through the nail.  Again this was done under fluoroscopic imaging and confirmed

appropriate length and placement through the nail was confirmed with fluoroscopic

imaging.



At this point time no further work was deemed necessary.  Final AP and lateral views

were taken.  The jig was removed.  The hip screw had been placed in the center of the

femoral head on both AP and lateral views.  The tip-apex distance was appropriate and

the distal interlocking screw was through the nail and was of appropriate length.

Rotation of the leg was also checked and was found to be appropriate.



At this point time no further work was deemed necessary.  We proceeded with closure.

All wounds were irrigated with sterile saline solution with antibiotic added.  The

tensor fascia was closed with #1 Vicryl interrupted suture.  The subcutaneous tissue

was closed with 2-0 Vicryl suture.  The skin was closed with stainless steel staples.

A sterile compressive dressing was applied.



All sponge and needle counts were deemed correct prior to closure.  The patient

tolerated the procedure without apparent complication.  He was transferred recovery

room in stable condition.





MMODL / IJN: [id number] / Job#: [id number]

## 2018-05-07 NOTE — P.PN
Progress Note - Text


Progress Note Date: 05/07/18





Patient is a very pleasant 82-year-old male who is seen and examined at bedside 

for follow-up evuation for his known left intertrochanteric hip fracture. His 

symptoms have not changed since being seen examined yesterday.  Patient has 

continued to have significant pain at the left hip following his fall.  He has 

been nonambulatory.  He denies any right hip pain or discomfort.  He denies any 

other injuries.  Patient states he is known to be on Coumadin which he states 

is for his legs.  Coumadin is currently being held in anticipation for surgical 

intervention.  Lab testing yesterday showed an INR level of 4.1.  Patient has 

been seen and examined by Dr. Will in medicine today who is seeing the 

patient for surgical clearance.  He has ordered a repeat PT/INR lab draws.  

Patient has no other complaints this morning.  He is nothing by mouth status in 

anticipation for surgical intervention.





Patient History:


Patient states he was in his kitchen Saturday when he turned one direction, 

then turned the other direction, and then fell on his left hip.  He was taken 

to [facility] for further evaluation.  He was subsequently transferred 

to [facility] and admitted to Dr. Edwards's service.  Imaging 

taken at [facility] show evidence of a left intertrochanteric hip fracture.  











Physical exam: 


Patient is awake, alert, and oriented 3


Vital signs stable


Good chest excursion with deep inspiration and expiration


Abdomen soft nontender


Left lower extremity is shortened and externally rotated


Pain with palpation of the left hip


Significant pain with internal and external rotation of the left hip


No pain with internal and external rotation of the right hip


Neurovascularly intact bilateral lower extremities


Dorsiflexion, plantarflexion, and extensor hallucis longus positive sustained 

bilaterally


Calves are soft and supple; No signs or symptoms of DVT; No calf pain











Pertinent studies:


PT/INR taken on 05/05/2018: INR 3.8; PT 33.8





Analysis taken on 05/06/2018: Positive for urine RBC, WBC, bacteria, mucous, 

protein, glucose, and ketones





Urine analysis taken on 05/06/2018: Positive for urine protein, glucose with 

rare mucous small blood and trace ketones





PT/INR taken on 05/06/2018: INR 4.1; PT 36.5











Assessment:


Left intertrochanteric hip fracture status post fall


Left hip pain


Currently on anticoagulation therapy with warfarin


Possible urinary tract infection











Plan:


1.  Patient was admitted to Dr. Edwards at [facility] after 

transfer from [facility].  Patient has evidence of a left 

intertrochanteric hip fracture.  Patient will plan to undergo a ORIF of the 

left hip with intramedullary nail fixation for left intertrochanteric hip 

fracture.  Patient currently has an elevated INR level of 4.1 as of 05/06/2018.

  Repeat lab draws have been ordered stat this morning.  If his INR level is 

able to reduce to a therapeutic range, we will plan to proceed for surgical 

intervention tomorrow, 05/07/2018 at approximately 1500 hrs.  Patient will 

remain on bedrest and nonweightbearing on the left lower extremity.  He may eat 

a regular diet today while becoming nothing by mouth status at midnight.  

Patient will continue to be seen and examined by Dr. Will to obtain surgical 

clearance prior to surgical intervention.  Chest x-ray has been ordered today.  

Urinalysis obtained yesterday has improved with better results as compared to 

previous urinalysis taken earlier in the day.  We'll continue to follow patient 

closely and we'll plan for surgical intervention today at 1500 hrs. if cleared 

from a medical standpoint.


2.  Dr. Will in medicine will continue following the patient for medical 

clearance prior to surgical intervention


3.  Patient has been discussed in detail with Dr. Sherif Edwards and he agrees 

with this plan

## 2018-05-08 LAB
GLUCOSE BLD-MCNC: 144 MG/DL (ref 75–99)
GLUCOSE BLD-MCNC: 221 MG/DL (ref 75–99)
GLUCOSE BLD-MCNC: 230 MG/DL (ref 75–99)
GLUCOSE BLD-MCNC: 274 MG/DL (ref 75–99)
GLUCOSE BLD-MCNC: 301 MG/DL (ref 75–99)

## 2018-05-08 RX ADMIN — DOCUSATE SODIUM SCH MG: 100 CAPSULE, LIQUID FILLED ORAL at 21:28

## 2018-05-08 RX ADMIN — BUDESONIDE AND FORMOTEROL FUMARATE DIHYDRATE SCH PUFF: 160; 4.5 AEROSOL RESPIRATORY (INHALATION) at 07:21

## 2018-05-08 RX ADMIN — INSULIN DETEMIR SCH UNIT: 100 INJECTION, SOLUTION SUBCUTANEOUS at 00:00

## 2018-05-08 RX ADMIN — METOPROLOL TARTRATE SCH MG: 25 TABLET, FILM COATED ORAL at 00:00

## 2018-05-08 RX ADMIN — INSULIN ASPART SCH UNIT: 100 INJECTION, SOLUTION INTRAVENOUS; SUBCUTANEOUS at 08:30

## 2018-05-08 RX ADMIN — METOPROLOL TARTRATE SCH MG: 25 TABLET, FILM COATED ORAL at 21:28

## 2018-05-08 RX ADMIN — METOPROLOL TARTRATE SCH MG: 25 TABLET, FILM COATED ORAL at 08:30

## 2018-05-08 RX ADMIN — INSULIN ASPART SCH UNIT: 100 INJECTION, SOLUTION INTRAVENOUS; SUBCUTANEOUS at 21:32

## 2018-05-08 RX ADMIN — HYDROCODONE BITARTRATE AND ACETAMINOPHEN PRN EACH: 7.5; 325 TABLET ORAL at 15:31

## 2018-05-08 RX ADMIN — DOCUSATE SODIUM SCH MG: 100 CAPSULE, LIQUID FILLED ORAL at 13:58

## 2018-05-08 RX ADMIN — MORPHINE SULFATE PRN MG: 4 INJECTION, SOLUTION INTRAMUSCULAR; INTRAVENOUS at 04:22

## 2018-05-08 RX ADMIN — BUDESONIDE AND FORMOTEROL FUMARATE DIHYDRATE SCH PUFF: 160; 4.5 AEROSOL RESPIRATORY (INHALATION) at 19:47

## 2018-05-08 RX ADMIN — INSULIN DETEMIR SCH UNIT: 100 INJECTION, SOLUTION SUBCUTANEOUS at 21:32

## 2018-05-08 RX ADMIN — INSULIN ASPART SCH UNIT: 100 INJECTION, SOLUTION INTRAVENOUS; SUBCUTANEOUS at 18:08

## 2018-05-08 RX ADMIN — HYDROCODONE BITARTRATE AND ACETAMINOPHEN PRN EACH: 7.5; 325 TABLET ORAL at 08:30

## 2018-05-08 RX ADMIN — HYDROCODONE BITARTRATE AND ACETAMINOPHEN PRN EACH: 7.5; 325 TABLET ORAL at 21:35

## 2018-05-08 RX ADMIN — INSULIN ASPART SCH UNIT: 100 INJECTION, SOLUTION INTRAVENOUS; SUBCUTANEOUS at 13:58

## 2018-05-08 RX ADMIN — IPRATROPIUM BROMIDE AND ALBUTEROL SULFATE PRN ML: .5; 3 SOLUTION RESPIRATORY (INHALATION) at 07:21

## 2018-05-08 NOTE — P.PN
Subjective


Progress Note Date: 05/08/18


Principal diagnosis: 


Left IT fracture





Patient is seen at bedside this morning.  He is postop day #1 from a left IT 

fracture repair with IT nail.  He has pain at the surgical site as expected but 

denies any new complaints.  She denies numbness, tingling or calf pain.  Review 

of systems is negative for fever, chills, chest pain, shortness of breath or 

other








Objective





- Vital Signs


Vital signs: 


 Vital Signs











Temp  99.2 F   05/08/18 07:00


 


Pulse  92   05/08/18 07:38


 


Resp  17   05/07/18 22:20


 


BP  150/78   05/08/18 07:00


 


Pulse Ox  98   05/08/18 07:00








 Intake & Output











 05/07/18 05/08/18 05/08/18





 18:59 06:59 18:59


 


Intake Total  1001 120


 


Output Total 570 1100 


 


Balance -570 -99 120


 


Intake:   


 


  IV  1001 


 


  Oral   120


 


Output:   


 


  Urine 570 1075 


 


  Estimated Blood Loss  25 


 


Other:   


 


  Voiding Method Urinal Urinal 


 


  # Voids 2 1 2














- Exam


Inspection reveals a benign surgical wound.  There is no active bleeding or 

drainage.  Neurovascular status is intact throughout the lower extremity with 

motor and sensation fully intact.  Calf is soft and nontender.  2+ dorsalis 

pedis pulse and less than 2 second cap refill is present








- Constitutional


General appearance: Present: no acute distress





- Psychiatric


Psychiatric: Present: A&O x's 3, appropriate affect, intact judgment & insight





- Labs


CBC & Chem 7: 


 05/07/18 06:51





 05/06/18 10:36


Labs: 


 Abnormal Lab Results - Last 24 Hours (Table)











  05/07/18 05/07/18 05/07/18 Range/Units





  11:19 11:58 16:37 


 


PT    17.2 H  (9.0-12.0)  sec


 


INR    1.9 H  (<1.2)  


 


POC Glucose (mg/dL)  67 L  111 H   (75-99)  mg/dL














  05/07/18 05/07/18 05/08/18 Range/Units





  16:47 22:48 07:04 


 


PT     (9.0-12.0)  sec


 


INR     (<1.2)  


 


POC Glucose (mg/dL)  105 H  143 H  144 H  (75-99)  mg/dL














Assessment and Plan


(1) Fracture of left hip


Narrative/Plan: 


He will continue with routine postop orthopedic protocol including pain 

management, wound care, physical therapy, DVT prophylaxis and medical 

management.  Expect that he will transfer to rehab in the next 1-2 days.


Current Visit: Yes   Status: Acute   Priority: Medium   Code(s): S72.002A - 

FRACTURE OF UNSP PART OF NECK OF LEFT FEMUR, INIT   SNOMED Code(s): 022053622


   


Time with Patient: Less than 30

## 2018-05-08 NOTE — PN
PROGRESS NOTE



DATE OF SERVICE:

05/08/2018



This 82-year-old gentleman who was admitted and had left hip arthroplasty is 
closely

monitored at this time.  No chest pain.  No palpitations.  No fever.  Joseph 
swing bed is

considered as an option.



On exam, alert and oriented x3. Pulse 94, blood pressure 132/66, respiration 16,

temperature 99.9, pulse ox 93% on room air.

HEENT: Conjunctivae normal.

NECK: No jugular venous distention.

CARDIOVASCULAR SYSTEM:  S1, S2 muffled.

RESPIRATORY SYSTEM: Breath sounds diminished at the bases.  A few scattered 
rhonchi. No

crackles.

ABDOMEN: Soft, non-tender.

LEGS: Status post left hip surgery.

NERVOUS SYSTEM: No focal deficit.



LABS: Glucose 144, 230, 274.  WBC 7.2, hemoglobin 12.5, sodium 144, creatinine 
1.30.



ASSESSMENT:

1. Status post left hip fracture and intramedullary pain screw fixation for

    intertrochanteric hip fracture.

2. Paroxysmal atrial fibrillation.

3. Coumadin monitoring.

4. Hypertension.

5. Diabetes mellitus, type 2.





RECOMMENDATIONS AND DISCUSSION:

I recommend to continue current medication, continue symptomatic treatment. 
Otherwise

at this time I would recommend following the patient closely.  DVT prophylaxis.

Continue the rest of the medications.  Guarded prognosis.  Further 
recommendations to

follow. INR is today 1.9.  I recommend Coumadin 4 mg daily.



MMODL / IJN: [id number] / Job#: [id number]

MTDD

## 2018-05-08 NOTE — CDI
Last Revision, December 2017





Documentation Clarification Form



Date: 5/8/2018 12:33:00 PM

From: Lou WhatleyRubyRAMONA, CCDS

Phone: [phone]

MRN: [id number]

Admit Date: 5/5/2018 6:17:00 PM

Patient Name: Ney Vieyra

Visit Number: [id number]

Discharge Date:



ATTENTION: The Clinical Documentation Specialists (CDI) and [facility] Coding Staff 
appreciate your assistance in clarifying documentation. Please respond to the 
clarification below the line at the bottom and electronically sign. The CDI & 
[facility] Coding staff will review the response and follow-up if needed. Please note: 
Queries are made part of the Legal Health Record. If you have any questions, 
please contact the author of this message via ITS.



Dr. Jim Will:



Atrial fibrillation is documented in the medical management consult and also 
the progress note on 5/7 as Atrial Fibrillation, rate controlled with Coumadin 
held for orthopedic surgery.

 

History/Risk Factors: A Fib, 1st degree AV block (on EKG), COPD, DM, Prostate 
CA sp radiation and Hypertension.

Clinical Indicators: 

Home meds: Coumadin, Zocor, Inderal, Lisinopril, Insulin sc

VS: P 71, /93

EKG 5/5: R 72 Sinus rhythm w/1st degree AV block.

Treatment: Telemetry, Hypoglycemia protocol, IV fl 100, Insulin sq, IV Ms, 
Albuterol INH, Budesonide INH



In your professional opinion, can you please clarify the type of atrial 
fibrillation, if known?  



    Chronic/Permanent

    Paroxysmal

    Persistent

    Other, please specify

    Unable to determine



Please continue to document in your progress notes and discharge summary in 
order to capture severity of illness and risk of mortality. Include clinical 
findings that support your diagnosis.

___________________________________________________________________
MTDD

## 2018-05-08 NOTE — XR
Fluoroscopy

 

History: ORIF left hip

 

Dynamic compression screw and intramedullary nuha proximal left femur. Alignment appears anatomic.55 s
ecs fluoro. 3 images scanned.

## 2018-05-09 VITALS
TEMPERATURE: 98.6 F | DIASTOLIC BLOOD PRESSURE: 61 MMHG | SYSTOLIC BLOOD PRESSURE: 133 MMHG | RESPIRATION RATE: 16 BRPM | HEART RATE: 80 BPM

## 2018-05-09 LAB
GLUCOSE BLD-MCNC: 168 MG/DL (ref 75–99)
INR PPP: 1.2 (ref ?–1.2)
PT BLD: 11.2 SEC (ref 9–12)

## 2018-05-09 RX ADMIN — INSULIN ASPART SCH UNIT: 100 INJECTION, SOLUTION INTRAVENOUS; SUBCUTANEOUS at 08:19

## 2018-05-09 RX ADMIN — DOCUSATE SODIUM SCH MG: 100 CAPSULE, LIQUID FILLED ORAL at 08:18

## 2018-05-09 RX ADMIN — HYDROCODONE BITARTRATE AND ACETAMINOPHEN PRN EACH: 7.5; 325 TABLET ORAL at 05:18

## 2018-05-09 RX ADMIN — METOPROLOL TARTRATE SCH MG: 25 TABLET, FILM COATED ORAL at 08:18

## 2018-05-09 RX ADMIN — BUDESONIDE AND FORMOTEROL FUMARATE DIHYDRATE SCH: 160; 4.5 AEROSOL RESPIRATORY (INHALATION) at 12:09

## 2018-05-09 NOTE — PN
PROGRESS NOTE



DATE OF SERVICE:

05/09/2018



This 82-year-old gentleman who was admitted after left hip fracture is improving

significantly. Patient is slated for ECF rehab at this time. No chest pain. No

palpitations. No fever.



On exam, alert and oriented x3. Pulse 80, blood pressure 133/61, respiration 16,

temperature 98.6, pulse ox 94% on room air.

HEENT:  Conjunctivae normal.

NECK: No jugular venous distention.

CARDIOVASCULAR SYSTEM:  S1, S2 muffled.

RESPIRATORY SYSTEM: Breath sounds diminished at the bases.  A few scattered rhonchi and

crackles.

ABDOMEN: Soft, non-tender.

LEGS: No edema. No swelling.

NERVOUS SYSTEM: No focal deficit.



Labs are noted.  Accu-Cheks 221, 168.



ASSESSMENT:

1. Status post left hip fracture intramedullary screw fixation for intertrochanteric

    hip fracture.

2. Paroxysmal atrial fibrillation.

3. Coumadin monitoring.

4. Hypertension.

5. Diabetes mellitus, type 2.

6. Possible chronic kidney disease, stage III.



RECOMMENDATIONS AND DISCUSSION:

I recommend to continue current medication, continue symptomatic treatment. Repeat

labs. PT/INR on a daily basis and adjustment. Otherwise, continue the rest of the

medications and follow closely with primary physician.  The rest of the medications per

Orthopedic Surgery.  Further recommendations to follow.





MMODL / IJN: [id number] / Job#: [id number]

## 2018-05-09 NOTE — P.DS
Providers


Date of admission: 


05/05/18 18:17





Expected date of discharge: 05/09/18


Attending physician: 


Sherif Edwards





Consults: 





 





05/05/18 22:00


Consult Physician Routine 


   Consulting Provider: Greg Fernandes


   Consult Reason/Comments: Medical clearance


   Do you want consulting provider notified?: Yes











Primary care physician: 


Physician Nonstaff








- Discharge Diagnosis(es)


(1) Fracture of left hip


Patient was admitted to the OR on 5/7/2018 to undergo Left IT nail/hip screw 

for left IT hip fracture. He desired to proceed with elective surgery after 

given informed consent.  He underwent the above procedure which he tolerated 

well without complication.  Postoperative hospital course has remained without 

complication.  On day of discharge he is afebrile, vital signs stable, labs 

within acceptable ranges, tolerating by mouth meds and diet, voiding without 

difficulty, positive flatus, denies abdominal pain or calf pain, pain is 

controlled on oral pain medication and has no new complaints.  Wound is benign, 

neurovascular status is intact, calf is soft and nontender, abdomen soft and 

nontender.  Review of systems is negative for numbness, tingling, fever, chills

, chest pain, shortness breath, nausea, vomiting, dizziness, headaches, slurred 

speech or other.


Current Visit: Yes   Status: Acute   Priority: Medium   


Procedures: 


Left IT nail





Patient Condition at Discharge: Fair





Plan - Discharge Summary


Discharge Rx Participant: No


New Discharge Prescriptions: 


New


   Docusate [Colace] 100 mg PO BID #60 capsule


   HYDROcodone/APAP 5-325MG [Norco 5-325] 1 tab PO Q4HR PRN #60 tab


     PRN Reason: Pain





No Action


   Simvastatin [Zocor] 80 mg PO HS


   Propranolol [Inderal] 40 mg PO BID


   Methazolamide 50 mg PO BID


   Insuln Asp Prt/Insulin Aspart [NovoLOG MIX 70-30 VIAL] 40 unit SQ AC-BRKFST


   Warfarin [Coumadin] 5 mg PO MOWEFR


   Warfarin Sodium [Coumadin] 4 mg PO SUTUTHSA


   Lisinopril 40 mg PO HS


   Insuln Asp Prt/Insulin Aspart [NovoLOG MIX 70-30 VIAL] 35 unit SQ AC-SUPPER


Discharge Medication List





Insuln Asp Prt/Insulin Aspart [NovoLOG MIX 70-30 VIAL] 35 unit SQ AC-SUPPER 05/ 05/18 [History]


Insuln Asp Prt/Insulin Aspart [NovoLOG MIX 70-30 VIAL] 40 unit SQ AC-BRKFST 05/ 05/18 [History]


Lisinopril 40 mg PO HS 05/05/18 [History]


Methazolamide 50 mg PO BID 05/05/18 [History]


Propranolol [Inderal] 40 mg PO BID 05/05/18 [History]


Simvastatin [Zocor] 80 mg PO HS 05/05/18 [History]


Warfarin Sodium [Coumadin] 4 mg PO SUTUTHSA 05/05/18 [History]


Warfarin [Coumadin] 5 mg PO MOWEFR 05/05/18 [History]


Docusate [Colace] 100 mg PO BID #60 capsule 05/09/18 [Rx]


HYDROcodone/APAP 5-325MG [Norco 5-325] 1 tab PO Q4HR PRN #60 tab 05/09/18 [Rx]








Follow up Appointment(s)/Referral(s): 


Je Moreno MD [REFERRING] - 1-2 days


Sherif Edwards MD [STAFF PHYSICIAN] - 2 Weeks


Activity/Diet/Wound Care/Special Instructions: 


Keep wound clean and dry


Take meds as directed


Follow-up with Dr. Edwards in office


Non weightbearing 


Stables out at POD #14








Discharge Disposition: TRANSFER TO SNF/ECF

## 2020-08-11 ENCOUNTER — HOSPITAL ENCOUNTER (OUTPATIENT)
Dept: HOSPITAL 47 - LABWHC1 | Age: 85
Discharge: HOME | End: 2020-08-11
Attending: DENTIST
Payer: MEDICARE

## 2020-08-11 DIAGNOSIS — Z79.01: ICD-10-CM

## 2020-08-11 DIAGNOSIS — Z51.81: Primary | ICD-10-CM

## 2020-08-11 LAB
INR PPP: 1 (ref ?–1.2)
PT BLD: 10 SEC (ref 9–12)

## 2020-08-11 PROCEDURE — 85610 PROTHROMBIN TIME: CPT

## 2020-08-11 PROCEDURE — 36415 COLL VENOUS BLD VENIPUNCTURE: CPT
